# Patient Record
Sex: MALE | Race: WHITE | Employment: FULL TIME | URBAN - METROPOLITAN AREA
[De-identification: names, ages, dates, MRNs, and addresses within clinical notes are randomized per-mention and may not be internally consistent; named-entity substitution may affect disease eponyms.]

---

## 2020-09-04 ENCOUNTER — TELEPHONE (OUTPATIENT)
Dept: NEUROLOGY | Facility: CLINIC | Age: 40
End: 2020-09-04

## 2020-09-04 NOTE — TELEPHONE ENCOUNTER
DMV form received, via mail on 9/4/20. Form placed in R drive for nurse to complete. Patient is seeing Dr. Miller via video visit on 9/14/20.

## 2020-09-04 NOTE — LETTER
9/4/2020       RE: Trevor Manuel  [facility] [place] [address]      Hi Trevor,    We received your DMV form, however it was not completely filled out and therefore we are unable to comment on your ability to drive. Please fill out the date of your last episode portion and mail it back to us.       Thank you,    SERA Saavedra

## 2020-09-09 NOTE — TELEPHONE ENCOUNTER
DMV form was not filled out completely by pt. Writer mailed form to pt asking for completion then return.

## 2020-09-14 ENCOUNTER — VIRTUAL VISIT (OUTPATIENT)
Dept: NEUROLOGY | Facility: CLINIC | Age: 40
End: 2020-09-14
Payer: COMMERCIAL

## 2020-09-14 DIAGNOSIS — G40.309 NONINTRACTABLE GENERALIZED IDIOPATHIC EPILEPSY WITHOUT STATUS EPILEPTICUS (H): Primary | ICD-10-CM

## 2020-09-14 NOTE — PROGRESS NOTES
[facility]       Patient:    :  1980  Age:  39 year old  Today'svirtual Visit:  2020      Referring Provider:    N/A, return since 2016    Epilepsy Data:  Primary Epileptologist/Provider: Patricia Cruz M.D.  Epilepsy Syndrome: Generalized Epilepsy unspecified  Age of Onset: 18  Other Relevant Dx/ Issues: History of mild concussions during high school football.  Left knee arthroscopic surgery. Trauma to right eye with placement of artificial lens.      Tests/Surgery History  Last EE07  Last MRI: 07     Seizure Record  Seizure Type 1: Typical Absence seizures  Seizure Type 2: Tonic-clonic seizures  Seizure Type 3: Myoclonic seizures      History of Present Illness:    Mr. Manuel is a 38yo M w pmh idiopathic generalized epilepsy who has been seizure free since 2003 (last event) years and off anti-seizure meds since . No health changes since last appointment. He has just made this appointment for a DMV form update. Denies issues with driving.   No smoking. Drinks a beer a few times a month. Denies recreational drugs. Denies family history of seizures.     Epilepsy Risk Factors:  Head Trauma (a few events of LoC in high school football).   Precipitating factors:   N/A    Past Medical History:   Diagnosis Date     Eye trauma     w/ placement of artifical lens     H/O multiple concussions     during high school footbal     Primary generalized epilepsy (H)      Past Surgical History:   Procedure Laterality Date     EYE SURGERY      Artifical lens placement     KNEE SURGERY      arthroscopic       Employment/School:  Employed full time as a consultant for medical devices and pharmaceuticals.   Driving:  Currently patient is:  Driving: Patient was made aware of state driving laws. Currently meets criteria to continue to drive.  Previous Evaluations for Epilepsy:   EEG 14:  INTERICTAL ACTIVITY: \"During maximal wakefulness, there was 11 Hz " alpha activity over the posterior head regions which was symmetric and attenuated by eye opening.  Drowsiness was manifested as dropout of the posterior dominant rhythm and diffuse theta activity and horizontal eye movements.  Stage II sleep was manifested as vertex waves, symmetric sleep spindles and K complexes.  No epileptiform discharges or pathological slowing was present.  Photic stimulation induced symmetric photic driving response in multiple flash frequencies.  Hyperventilation did not induce an abnormal activity on the EEG.    CLINICAL EVENTS:  No electrographic or clinical seizures were recorded.   IMPRESSION:  Normal video EEG.  No epileptiform discharges were present.  No seizures were recorded.   MRI of Brain 1/18/07: Report from [facility] WNL for age.     Review of Systems:  Lethargy / Tiredness:  No  Nausea / Vomiting:  No  Double Vision:  No  Sleepiness:  No  Depression:  No  Slowed Cognitive Function:  No  Memory Problems:  No  Poor Balance:  No  Dizziness:  No  Appetite Changes:  No  Blurred Vision:  No  Sleep Changes:  No  Behavioral Changes:  No  Have you experienced a traumatic fall related to your events: No  Are these falls related to your seizures: N/A    Exam:  General: Adult, in NAD, cooperative  HEENT: NC/AT, no icterus, op pink and moist  Skin: No rash or lesions visualized  Psych: normal mood and affect  Neuro:  Mental status: Awake, alert, attentive, oriented to p/p/t. Speech is fluent, comprehension intact. No dysarthria.  Cranial nerves: Eyes conjugate, pupils equal, EOMI, face symmetric,  shoulder shrug strong, tongue midline, hearing intact to conversation.  Motor: No atrophy or twitches. No pronator drift. Able to stand from chair without difficulty.   Coordination: FNF no dysmetria.  Gait: Normal width, stride length, turn, and arm swing. Station normal.      Assessment and Plan:   Mr. Manuel is a 38yo M w [facility] idiopathic generalized epilepsy who is returning to have his DMV form filled  out. Has been off Depakote since 2014. His last seizure was in 2003.       -PCP can fill out form going forward if they are comfortable doing so  -Return to clinic prn if seizures recur or if patient has other issues        Arabella Valentin M.D.  PGY-3 Neurology    I saw and evaluated the patient and discussed the plan of care with Dr. Valentin. I have reviewed her note and agree with the findings, impression and plan. I spent 15 minutes talking to the patient and reviewed the medical history and discussed the plan of care.  Patricia Cruz MD

## 2020-09-14 NOTE — LETTER
"2020       RE: Trevor Manuel  : 1980   MRN: [id number]      Dear Colleague,    Thank you for referring your patient, Trevor Manuel, to the [facility] EPILE[facility]ARE at [facility]. Please see a copy of my visit note below.    Trevor Manuel is a 39 year old male who is being evaluated via a billable video visit.      The patient has been notified of following:     \"This video visit will be conducted via a call between you and your physician/provider. We have found that certain health care needs can be provided without the need for an in-person physical exam.  This service lets us provide the care you need with a video conversation.  If a prescription is necessary we can send it directly to your pharmacy.  If lab work is needed we can place an order for that and you can then stop by our lab to have the test done at a later time.    Video visits are billed at different rates depending on your insurance coverage.  Please reach out to your insurance provider with any questions.    If during the course of the call the physician/provider feels a video visit is not appropriate, you will not be charged for this service.\"    Patient has given verbal consent for Video visit? Yes  How would you like to obtain your AVS? Mail a copy  If you are dropped from the video visit, the video invite should be resent to: Text to cell phone: [phone]  Will anyone else be joining your video visit? No        Video-Visit Details    Type of service:  Video Visit    Video Start Time: 9:07 AM  Video End Time: 9:22 AM    Originating Location (pt. Location): Home    Distant Location (provider location):  [facility] EPILEPSY CARE     Platform used for Video Visit: Publim[employer]          [facility] Epilepsy Care History and Physical       Patient:    :  1980  Age:  39 year old  Today'svirtual Visit:  2020      Referring Provider:    N/A, return since     Epilepsy Data:  Primary " "Epileptologist/Provider: Patricia Cruz M.D.  Epilepsy Syndrome: Generalized Epilepsy unspecified  Age of Onset: 18  Other Relevant Dx/ Issues: History of mild concussions during high school football.  Left knee arthroscopic surgery. Trauma to right eye with placement of artificial lens.      Tests/Surgery History  Last EE07  Last MRI: 07     Seizure Record  Seizure Type 1: Typical Absence seizures  Seizure Type 2: Tonic-clonic seizures  Seizure Type 3: Myoclonic seizures      History of Present Illness:    Mr. Manuel is a 40yo M w pmh idiopathic generalized epilepsy who has been seizure free since 2003 (last event) years and off anti-seizure meds since . No health changes since last appointment. He has just made this appointment for a DMV form update. Denies issues with driving.   No smoking. Drinks a beer a few times a month. Denies recreational drugs. Denies family history of seizures.     Epilepsy Risk Factors:  Head Trauma (a few events of LoC in high school football).   Precipitating factors:   N/A    Past Medical History:   Diagnosis Date     Eye trauma     w/ placement of artifical lens     H/O multiple concussions     during high school footbal     Primary generalized epilepsy (H)      Past Surgical History:   Procedure Laterality Date     EYE SURGERY      Artifical lens placement     KNEE SURGERY      arthroscopic       Employment/School:  Employed full time as a consultant for medical devices and pharmaceuticals.   Driving:  Currently patient is:  Driving: Patient was made aware of state driving laws. Currently meets criteria to continue to drive.  Previous Evaluations for Epilepsy:   EEG 14:  INTERICTAL ACTIVITY: \"During maximal wakefulness, there was 11 Hz alpha activity over the posterior head regions which was symmetric and attenuated by eye opening.  Drowsiness was manifested as dropout of the posterior dominant rhythm and diffuse theta activity and horizontal eye " movements.  Stage II sleep was manifested as vertex waves, symmetric sleep spindles and K complexes.  No epileptiform discharges or pathological slowing was present.  Photic stimulation induced symmetric photic driving response in multiple flash frequencies.  Hyperventilation did not induce an abnormal activity on the EEG.    CLINICAL EVENTS:  No electrographic or clinical seizures were recorded.   IMPRESSION:  Normal video EEG.  No epileptiform discharges were present.  No seizures were recorded.   MRI of Brain 1/18/07: Report from [facility]NL for age.     Review of Systems:  Lethargy / Tiredness:  No  Nausea / Vomiting:  No  Double Vision:  No  Sleepiness:  No  Depression:  No  Slowed Cognitive Function:  No  Memory Problems:  No  Poor Balance:  No  Dizziness:  No  Appetite Changes:  No  Blurred Vision:  No  Sleep Changes:  No  Behavioral Changes:  No  Have you experienced a traumatic fall related to your events: No  Are these falls related to your seizures: N/A    Exam:  General: Adult, in NAD, cooperative  HEENT: NC/AT, no icterus, op pink and moist  Skin: No rash or lesions visualized  Psych: normal mood and affect  Neuro:  Mental status: Awake, alert, attentive, oriented to p/p/t. Speech is fluent, comprehension intact. No dysarthria.  Cranial nerves: Eyes conjugate, pupils equal, EOMI, face symmetric,  shoulder shrug strong, tongue midline, hearing intact to conversation.  Motor: No atrophy or twitches. No pronator drift. Able to stand from chair without difficulty.   Coordination: FNF no dysmetria.  Gait: Normal width, stride length, turn, and arm swing. Station normal.      Assessment and Plan:   Mr. Manuel is a 40yo M w [facility] idiopathic generalized epilepsy who is returning to have his DMV form filled out. Has been off Depakote since 2014. His last seizure was in 2003.       -PCP can fill out form going forward if they are comfortable doing so  -Return to clinic prn if seizures recur or if patient has other  issues        Arabella Valentin M.D.  PGY-3 Neurology    I saw and evaluated the patient and discussed the plan of care with Dr. Valentin. I have reviewed her note and agree with the findings, impression and plan. I spent 15 minutes talking to the patient and reviewed the medical history and discussed the plan of care.  Patricia Cruz MD          Again, thank you for allowing me to participate in the care of your patient.      Sincerely,    Patricia Cruz MD

## 2020-09-14 NOTE — PROGRESS NOTES
"Trevor Manuel is a 39 year old male who is being evaluated via a billable video visit.      The patient has been notified of following:     \"This video visit will be conducted via a call between you and your physician/provider. We have found that certain health care needs can be provided without the need for an in-person physical exam.  This service lets us provide the care you need with a video conversation.  If a prescription is necessary we can send it directly to your pharmacy.  If lab work is needed we can place an order for that and you can then stop by our lab to have the test done at a later time.    Video visits are billed at different rates depending on your insurance coverage.  Please reach out to your insurance provider with any questions.    If during the course of the call the physician/provider feels a video visit is not appropriate, you will not be charged for this service.\"    Patient has given verbal consent for Video visit? Yes  How would you like to obtain your AVS? Mail a copy  If you are dropped from the video visit, the video invite should be resent to: Text to cell phone: [phone]  Will anyone else be joining your video visit? No        Video-Visit Details    Type of service:  Video Visit    Video Start Time: 9:07 AM  Video End Time: 9:22 AM    Originating Location (pt. Location): Home    Distant Location (provider location):  [facility]     Platform used for Video Visit: Walker        "

## 2020-09-15 ENCOUNTER — TELEPHONE (OUTPATIENT)
Dept: NEUROLOGY | Facility: CLINIC | Age: 40
End: 2020-09-15

## 2020-09-15 NOTE — TELEPHONE ENCOUNTER
DMV form completed fax and mailed to DMV, Copy mailed to patient and placed in folder ,also fax to [facility].

## 2024-10-15 ENCOUNTER — TELEPHONE (OUTPATIENT)
Dept: NEUROLOGY | Facility: CLINIC | Age: 44
End: 2024-10-15

## 2024-10-15 NOTE — TELEPHONE ENCOUNTER
Received DMV(LOC)  Form to be completed. Form saved to [employer], encounter routed.  Binh Banks LPN

## 2024-10-17 NOTE — TELEPHONE ENCOUNTER
DMV form signed, faxed and mailed  to [employer] on 10/17/2024, sent to scanning, and copy mailed to patient.